# Patient Record
Sex: FEMALE | Race: WHITE | URBAN - METROPOLITAN AREA
[De-identification: names, ages, dates, MRNs, and addresses within clinical notes are randomized per-mention and may not be internally consistent; named-entity substitution may affect disease eponyms.]

---

## 2022-02-21 ENCOUNTER — OFFICE VISIT (OUTPATIENT)
Dept: GERIATRIC MEDICINE | Age: 87
End: 2022-02-21
Payer: MEDICARE

## 2022-02-21 DIAGNOSIS — S09.90XD CLOSED HEAD INJURY, SUBSEQUENT ENCOUNTER: ICD-10-CM

## 2022-02-21 DIAGNOSIS — E03.9 HYPOTHYROIDISM, UNSPECIFIED TYPE: ICD-10-CM

## 2022-02-21 DIAGNOSIS — D64.9 ANEMIA, UNSPECIFIED TYPE: ICD-10-CM

## 2022-02-21 DIAGNOSIS — I10 PRIMARY HYPERTENSION: ICD-10-CM

## 2022-02-21 DIAGNOSIS — F32.A DEPRESSION, UNSPECIFIED DEPRESSION TYPE: ICD-10-CM

## 2022-02-21 DIAGNOSIS — S00.83XD: ICD-10-CM

## 2022-02-21 DIAGNOSIS — E78.5 HYPERLIPIDEMIA, UNSPECIFIED HYPERLIPIDEMIA TYPE: ICD-10-CM

## 2022-02-21 DIAGNOSIS — R55 SYNCOPE AND COLLAPSE: ICD-10-CM
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
## 2022-02-21 DIAGNOSIS — M70.42 PREPATELLAR BURSITIS OF LEFT KNEE: ICD-10-CM

## 2022-02-21 DIAGNOSIS — I48.19 PERSISTENT ATRIAL FIBRILLATION (HCC): ICD-10-CM

## 2022-02-21 PROCEDURE — G8484 FLU IMMUNIZE NO ADMIN: HCPCS | Performed by: NURSE PRACTITIONER

## 2022-02-21 PROCEDURE — 1123F ACP DISCUSS/DSCN MKR DOCD: CPT | Performed by: NURSE PRACTITIONER

## 2022-02-21 PROCEDURE — 99309 SBSQ NF CARE MODERATE MDM 30: CPT | Performed by: NURSE PRACTITIONER

## 2022-02-22 PROBLEM — I10 PRIMARY HYPERTENSION: Status: ACTIVE | Noted: 2022-02-22

## 2022-02-22 PROBLEM — R55 SYNCOPE AND COLLAPSE: Status: ACTIVE | Noted: 2022-02-22

## 2022-02-22 PROBLEM — I48.19 PERSISTENT ATRIAL FIBRILLATION (HCC): Status: ACTIVE | Noted: 2022-02-22

## 2022-02-22 PROBLEM — F32.A DEPRESSION: Status: ACTIVE | Noted: 2022-02-22
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
## 2022-02-22 PROBLEM — S09.90XA CLOSED HEAD INJURY: Status: ACTIVE | Noted: 2022-02-22

## 2022-02-22 PROBLEM — D64.9 ANEMIA: Status: ACTIVE | Noted: 2022-02-22

## 2022-02-22 PROBLEM — E78.5 HYPERLIPIDEMIA: Status: ACTIVE | Noted: 2022-02-22

## 2022-02-22 PROBLEM — M70.42 PREPATELLAR BURSITIS OF LEFT KNEE: Status: ACTIVE | Noted: 2022-02-22

## 2022-02-22 PROBLEM — S00.83XA: Status: ACTIVE | Noted: 2022-02-22

## 2022-02-22 PROBLEM — E03.9 HYPOTHYROIDISM: Status: ACTIVE | Noted: 2022-02-22

## 2022-02-22 LAB
BASOPHILS ABSOLUTE: 0 K/UL (ref 0–0.2)
BASOPHILS RELATIVE PERCENT: 0.5 %
EOSINOPHILS ABSOLUTE: 0.1 K/UL (ref 0–0.7)
EOSINOPHILS RELATIVE PERCENT: 1.8 %
HCT VFR BLD CALC: 28.4 % (ref 37–47)
HEMOGLOBIN: 9.4 G/DL (ref 12–16)
LYMPHOCYTES ABSOLUTE: 0.9 K/UL (ref 1–4.8)
LYMPHOCYTES RELATIVE PERCENT: 16.4 %
MCH RBC QN AUTO: 32 PG (ref 27–31.3)
MCHC RBC AUTO-ENTMCNC: 33.2 % (ref 33–37)
MCV RBC AUTO: 96.6 FL (ref 82–100)
MONOCYTES ABSOLUTE: 0.5 K/UL (ref 0.2–0.8)
MONOCYTES RELATIVE PERCENT: 8.8 %
NEUTROPHILS ABSOLUTE: 4 K/UL (ref 1.4–6.5)
NEUTROPHILS RELATIVE PERCENT: 72.5 %
PDW BLD-RTO: 14.3 % (ref 11.5–14.5)
PLATELET # BLD: 153 K/UL (ref 130–400)
RBC # BLD: 2.94 M/UL (ref 4.2–5.4)
TSH SERPL DL<=0.05 MIU/L-ACNC: 5.75 UIU/ML (ref 0.44–3.86)
WBC # BLD: 5.5 K/UL (ref 4.8–10.8)

## 2022-02-22 RX ORDER — HYDROCODONE BITARTRATE AND ACETAMINOPHEN 5; 325 MG/1; MG/1
0.5 TABLET ORAL EVERY 8 HOURS PRN
Qty: 28 TABLET | Refills: 0 | Status: SHIPPED | OUTPATIENT
Start: 2022-02-22 | End: 2022-03-01

## 2022-02-22 NOTE — PROGRESS NOTES
Organizations: Not on file    Attends Club or Organization Meetings: Not on file    Marital Status: Not on file   Intimate Partner Violence:     Fear of Current or Ex-Partner: Not on file    Emotionally Abused: Not on file    Physically Abused: Not on file    Sexually Abused: Not on file   Housing Stability:     Unable to Pay for Housing in the Last Year: Not on file    Number of [place] in the Last Year: Not on file    Unstable Housing in the Last Year: Not on file     ALLERGIES:  Contrast [iodides], Promethazine, Thiopental, and Trazodone    MEDICATIONS: Acetaminophen 500 mg tab 2 tabs p.o. twice daily, apixaban 5 mg tab twice daily, ASA delayed release 81 mg tab p.o. daily, atorvastatin calcium 40 mg tab p.o. daily, bimatoprost solution 0.01% 1 drop both eyes daily, Dulcolax suppository 10 mg rectal suppository daily as needed escitalopram oxylate tab 20 mg tab 1 tab p.o. daily, ferrous sulfate 325 mg tab p.o. daily, gabapentin 100 mg capsule p.o. daily as needed, gabapentin 100 mg tab p.o. daily, levothyroxine sodium 125 MCG tab p.o. daily, magnesium citrate solution 1.745 g\30 mL give 30 mL p.o. daily as needed metoprolol succinate ER tab give 12.5 mg p.o. daily, milk of magnesia suspension 1200 mg per 15 mL 30 mL p.o. daily as needed nitroglycerin tablet 0.4 mg sublingual tab as needed for chest pain every 3 minutes up to 3 doses senna tab 8.6 mg tab 2 tabs p.o. daily, timolol maleate solution 0.5% instill 1 drop right eye 3 times daily, vitamin D3 2000 units tab p.o. twice daily    REVIEW OF SYSTEMS:  Resident reports headache. Denies dizziness, blurred vision. She denies any fever, chills, sore throat. She denies any rashes, bruises, or open areas. She denies any chest pain, chest discomfort, or heart palpitations. She denies any shortness of breath or cough. She denies any nausea, vomiting, or abdominal pain. She denies any urinary urgency, frequency, or dysuria.   She reports constipation. Denies diarrhea. She states she is eating okay, sleeping okay, and she currently reports pain, generalized, constant, 10 out of 10 on a pain scale. PHYSICAL EXAMINATION:  Most recent vital signs: Blood pressure 110/66, respirations 18, temperature 97.8, heart rate 79, pulse oxing 93% room air     Constitutional:  Resident is a 80 y.o. female frail alert, pleasant, and cooperative with exam.  In no apparent distress. Integument:  Pink, warm, dry. No rashes noted. Extensive bruising in various stages of healing to the patient's left jaw, neck, shoulder, left torso, left leg, and left knee. HEENT:  Normocephalic, atraumatic. External ears appear to be within normal limits. She is hard of hearing. Conjunctivae pink. Sclerae nonicteric. Mucous membranes pink, moist.  No oropharyngeal exudate. Neck:  Supple. No cervical or clavicular lymphadenopathy. No masses noted. She is noted to have asked extensive ecchymosis surrounding her neck various stages of healing worse on the left side. Cardiovascular:  Heart rate regular rate and rhythm. No murmurs, gallops, rubs noted. Respiratory:  Lung sounds Normal.  Respirations nonlabored. The patient is not using accessory muscles with breathing. Currently pulse ox'ing 93% on room air. Abdomen:  Soft, nontender, nondistended, normoactive bowel sounds. No masses noted  Musculoskeletal: No muscle tenderness. No joint tenderness. PV:  Peripheral pulses present. She  does have nonpitting edema to her left lower extremity. Psychological: Mood stable. Affect pleasant. Speech normal rate and tone. ASSESSMENT AND PLAN:      Diagnosis Orders   1. Fall, subsequent encounter     2. Prepatellar bursitis of left knee  HYDROcodone-acetaminophen (NORCO) 5-325 MG per tablet   3. Closed head injury, subsequent encounter  HYDROcodone-acetaminophen (NORCO) 5-325 MG per tablet   4. Syncope and collapse     5. Primary hypertension     6.  Persistent atrial fibrillation ([facility] [address]     7. Contusion of mandibular joint area, subsequent encounter  HYDROcodone-acetaminophen (NORCO) 5-325 MG per tablet   8. Anemia, unspecified type     9. Hyperlipidemia, unspecified hyperlipidemia type     10. Hypothyroidism, unspecified type     11. Depression, unspecified depression type          1. Patient has not had any falls since her admission to this facility. 2. She did reports that she is having pain she reports the Tylenol is not effective in meeting her pain needs and she is requesting something stronger. I will order Norco 5/3/2025 one half tab every 8 hours as needed. She will continue to follow with Ortho as ordered. 3. Patient reports that the pain in her left is improving somewhat. She states that it was much more swollen before. 4. No syncopal episodes since her admission to this facility. 5. Patient's blood pressure has remained stable. We will continue her metoprolol as ordered. 6. Patient currently in a regular rate. She has not had any chest pain chest discomfort or heart palpitations. We will continue her apixaban as ordered. 7. Pain and swelling is slowly improving. She is able to eat her meals without difficulty. She reports that the Tylenol helps somewhat but she would like something stronger for her pain. 8. Hemoglobin 13.7 hematocrit 41.8. She denies any fatigue, chest pain, or shortness of breath. 9. Most recent lipid panel total cholesterol 143, triglycerides 146, HDL 44, LDL 70. Continue atorvastatin. 10. Last TSH on file 3.699 I will order TSH to be done with labs. We will continue her Synthroid as ordered. 11. Patient has not had any depressive episodes we will continue her escitalopram as ordered    I have reviewed this resident's medication and treatment plan as well as most recent lab work. CMP, CBC with diff., TSH and Vitamin D as ordered. I will also order Norco 5/325mg tab-one half tab by mouth every eight hours for pain.   No further changes will be made at this time. Return in about 1 week (around 2/28/2022), or if symptoms worsen or fail to improve. Please note this report is partially produced by using speech recognition hardware. It may contain errors related to the system, including grammar, punctuation and spelling as well as words and phrases that may seem inaccurate. For any questions or concerns feel free to contact me for clarification        Electronically Signed By: Bari Lan. Drea Hubbard CNP on 2/22/22   ______________________________  Bari Lan.  Steph NÚÑEZ CNP

## 2022-02-23 ENCOUNTER — OFFICE VISIT (OUTPATIENT)
Dept: GERIATRIC MEDICINE | Age: 87
End: 2022-02-23
Payer: MEDICARE

## 2022-02-23 DIAGNOSIS — R55 SYNCOPE, UNSPECIFIED SYNCOPE TYPE: Primary | ICD-10-CM

## 2022-02-23 DIAGNOSIS — S09.90XD CLOSED HEAD INJURY, SUBSEQUENT ENCOUNTER: ICD-10-CM

## 2022-02-23 DIAGNOSIS — R53.1 WEAKNESS: ICD-10-CM

## 2022-02-23 DIAGNOSIS — I48.19 PERSISTENT ATRIAL FIBRILLATION (HCC): ICD-10-CM

## 2022-02-23 LAB — VITAMIN D 25-HYDROXY: 60 NG/ML (ref 30–100)

## 2022-02-23 PROCEDURE — 1123F ACP DISCUSS/DSCN MKR DOCD: CPT | Performed by: INTERNAL MEDICINE

## 2022-02-23 PROCEDURE — 99305 1ST NF CARE MODERATE MDM 35: CPT | Performed by: INTERNAL MEDICINE

## 2022-02-23 PROCEDURE — G8484 FLU IMMUNIZE NO ADMIN: HCPCS | Performed by: INTERNAL MEDICINE

## 2022-03-04 ENCOUNTER — OFFICE VISIT (OUTPATIENT)
Dept: GERIATRIC MEDICINE | Age: 87
End: 2022-03-04
Payer: MEDICARE

## 2022-03-04 DIAGNOSIS — I48.19 PERSISTENT ATRIAL FIBRILLATION (HCC): Primary | ICD-10-CM

## 2022-03-04 DIAGNOSIS — R55 SYNCOPE AND COLLAPSE: ICD-10-CM

## 2022-03-04 PROCEDURE — 1123F ACP DISCUSS/DSCN MKR DOCD: CPT | Performed by: NURSE PRACTITIONER

## 2022-03-04 PROCEDURE — 99308 SBSQ NF CARE LOW MDM 20: CPT | Performed by: NURSE PRACTITIONER

## 2022-03-04 PROCEDURE — G8484 FLU IMMUNIZE NO ADMIN: HCPCS | Performed by: NURSE PRACTITIONER

## 2022-03-08 ENCOUNTER — OFFICE VISIT (OUTPATIENT)
Dept: GERIATRIC MEDICINE | Age: 87
End: 2022-03-08
Payer: MEDICARE

## 2022-03-08 DIAGNOSIS — M25.562 ACUTE PAIN OF LEFT KNEE: ICD-10-CM

## 2022-03-08 DIAGNOSIS — R05.9 COUGH: Primary | ICD-10-CM

## 2022-03-08 PROCEDURE — 1123F ACP DISCUSS/DSCN MKR DOCD: CPT | Performed by: NURSE PRACTITIONER

## 2022-03-08 PROCEDURE — G8484 FLU IMMUNIZE NO ADMIN: HCPCS | Performed by: NURSE PRACTITIONER

## 2022-03-08 PROCEDURE — 99309 SBSQ NF CARE MODERATE MDM 30: CPT | Performed by: NURSE PRACTITIONER

## 2022-03-09 LAB
ANION GAP SERPL CALCULATED.3IONS-SCNC: 16 MEQ/L (ref 9–15)
BUN BLDV-MCNC: 21 MG/DL (ref 8–23)
CALCIUM SERPL-MCNC: 9.7 MG/DL (ref 8.5–9.9)
CHLORIDE BLD-SCNC: 100 MEQ/L (ref 95–107)
CO2: 22 MEQ/L (ref 20–31)
CREAT SERPL-MCNC: 0.83 MG/DL (ref 0.5–0.9)
GFR AFRICAN AMERICAN: >60
GFR NON-AFRICAN AMERICAN: >60
GLUCOSE BLD-MCNC: 123 MG/DL (ref 70–99)
HCT VFR BLD CALC: 34.8 % (ref 37–47)
HEMOGLOBIN: 11.4 G/DL (ref 12–16)
MCH RBC QN AUTO: 31.9 PG (ref 27–31.3)
MCHC RBC AUTO-ENTMCNC: 32.7 % (ref 33–37)
MCV RBC AUTO: 97.5 FL (ref 82–100)
PDW BLD-RTO: 16 % (ref 11.5–14.5)
PLATELET # BLD: 286 K/UL (ref 130–400)
POTASSIUM SERPL-SCNC: 4 MEQ/L (ref 3.4–4.9)
RBC # BLD: 3.57 M/UL (ref 4.2–5.4)
SODIUM BLD-SCNC: 138 MEQ/L (ref 135–144)
WBC # BLD: 4.5 K/UL (ref 4.8–10.8)

## 2022-03-10 ENCOUNTER — OFFICE VISIT (OUTPATIENT)
Dept: GERIATRIC MEDICINE | Age: 87
End: 2022-03-10
Payer: MEDICARE

## 2022-03-10 DIAGNOSIS — U07.1 COVID: Primary | ICD-10-CM

## 2022-03-10 LAB
ALBUMIN SERPL-MCNC: 3.9 G/DL (ref 3.5–4.6)
ALP BLD-CCNC: 92 U/L (ref 40–130)
ALT SERPL-CCNC: 9 U/L (ref 0–33)
ANION GAP SERPL CALCULATED.3IONS-SCNC: 12 MEQ/L (ref 9–15)
AST SERPL-CCNC: 21 U/L (ref 0–35)
BASOPHILS ABSOLUTE: 0 K/UL (ref 0–0.2)
BASOPHILS RELATIVE PERCENT: 0.8 %
BILIRUB SERPL-MCNC: 0.6 MG/DL (ref 0.2–0.7)
BILIRUBIN DIRECT: <0.2 MG/DL (ref 0–0.4)
BILIRUBIN, INDIRECT: NORMAL MG/DL (ref 0–0.6)
BUN BLDV-MCNC: 22 MG/DL (ref 8–23)
C-REACTIVE PROTEIN: 27.5 MG/L (ref 0–5)
CALCIUM SERPL-MCNC: 9.7 MG/DL (ref 8.5–9.9)
CHLORIDE BLD-SCNC: 102 MEQ/L (ref 95–107)
CO2: 25 MEQ/L (ref 20–31)
CREAT SERPL-MCNC: 0.84 MG/DL (ref 0.5–0.9)
D DIMER: 2.89 MG/L FEU (ref 0–0.5)
EOSINOPHILS ABSOLUTE: 0.2 K/UL (ref 0–0.7)
EOSINOPHILS RELATIVE PERCENT: 3.3 %
GFR AFRICAN AMERICAN: >60
GFR NON-AFRICAN AMERICAN: >60
GLUCOSE BLD-MCNC: 90 MG/DL (ref 70–99)
HCT VFR BLD CALC: 32.5 % (ref 37–47)
HEMOGLOBIN: 10.8 G/DL (ref 12–16)
INR BLD: 1.4
LACTATE DEHYDROGENASE: 384 U/L (ref 135–214)
LYMPHOCYTES ABSOLUTE: 1.2 K/UL (ref 1–4.8)
LYMPHOCYTES RELATIVE PERCENT: 24.6 %
MCH RBC QN AUTO: 31.9 PG (ref 27–31.3)
MCHC RBC AUTO-ENTMCNC: 33.3 % (ref 33–37)
MCV RBC AUTO: 95.8 FL (ref 82–100)
MONOCYTES ABSOLUTE: 0.5 K/UL (ref 0.2–0.8)
MONOCYTES RELATIVE PERCENT: 10.1 %
NEUTROPHILS ABSOLUTE: 2.9 K/UL (ref 1.4–6.5)
NEUTROPHILS RELATIVE PERCENT: 61.2 %
PDW BLD-RTO: 15.9 % (ref 11.5–14.5)
PLATELET # BLD: 280 K/UL (ref 130–400)
POTASSIUM SERPL-SCNC: 3.9 MEQ/L (ref 3.4–4.9)
PROCALCITONIN: 0.11 NG/ML (ref 0–0.15)
PROTHROMBIN TIME: 17 SEC (ref 12.3–14.9)
RBC # BLD: 3.39 M/UL (ref 4.2–5.4)
SEDIMENTATION RATE, ERYTHROCYTE: 53 MM (ref 0–30)
SODIUM BLD-SCNC: 139 MEQ/L (ref 135–144)
TOTAL CK: 36 U/L (ref 0–170)
TOTAL PROTEIN: 7.1 G/DL (ref 6.3–8)
TROPONIN: <0.01 NG/ML (ref 0–0.01)
WBC # BLD: 4.7 K/UL (ref 4.8–10.8)

## 2022-03-10 PROCEDURE — 1123F ACP DISCUSS/DSCN MKR DOCD: CPT | Performed by: NURSE PRACTITIONER

## 2022-03-10 PROCEDURE — 99309 SBSQ NF CARE MODERATE MDM 30: CPT | Performed by: NURSE PRACTITIONER

## 2022-03-10 PROCEDURE — G8484 FLU IMMUNIZE NO ADMIN: HCPCS | Performed by: NURSE PRACTITIONER

## 2022-03-11 ENCOUNTER — OFFICE VISIT (OUTPATIENT)
Dept: GERIATRIC MEDICINE | Age: 87
End: 2022-03-11
Payer: MEDICARE

## 2022-03-11 DIAGNOSIS — U07.1 COVID: Primary | ICD-10-CM

## 2022-03-11 LAB — FERRITIN: 396 UG/L (ref 13–150)

## 2022-03-11 PROCEDURE — G8484 FLU IMMUNIZE NO ADMIN: HCPCS | Performed by: NURSE PRACTITIONER

## 2022-03-11 PROCEDURE — 1123F ACP DISCUSS/DSCN MKR DOCD: CPT | Performed by: NURSE PRACTITIONER

## 2022-03-11 PROCEDURE — 99309 SBSQ NF CARE MODERATE MDM 30: CPT | Performed by: NURSE PRACTITIONER

## 2022-03-15 LAB — TSH SERPL DL<=0.05 MIU/L-ACNC: 21.15 UIU/ML (ref 0.44–3.86)

## 2022-03-16 LAB
T3 TOTAL: 75 NG/DL (ref 60–181)
T4 TOTAL: 7 UG/DL (ref 4.5–10.9)

## 2022-03-18 ASSESSMENT — ENCOUNTER SYMPTOMS
COUGH: 1
BACK PAIN: 1
SHORTNESS OF BREATH: 0
CONSTIPATION: 1

## 2022-03-18 NOTE — PROGRESS NOTES
Patient Name: Oral Irving    YOB: 1927  Medical Record Number: [id number]        History of Present Illness: This 51-year-old woman was admitted here after recent hospitalization. Patient had a syncopal episode from a seated position. Patient did undergo evaluation by cardiology patient was recommended by neurology. Patient has been stabilized CT of the head was unremarkable acute intracranial bleed. Patient was tested for COVID-19 patient renal function stable include IV fluid rehydration patient had was stabilized had undergone course of physical therapy grade 3 transferred here for ongoing course of care. Patient is at her baseline pleasant but confused globally weak at her baseline. Review of Systems   Constitutional: Positive for fatigue. HENT: Negative for congestion. Respiratory: Positive for cough. Negative for shortness of breath. Cardiovascular: Positive for leg swelling. Negative for chest pain. Gastrointestinal: Positive for constipation. Musculoskeletal: Positive for back pain. Skin: Negative for rash. Neurological: Positive for weakness. All other systems reviewed and are negative. Review of Systems: All 14 review of systems negative other than as stated above    Social History     Tobacco Use    Smoking status: Not on file    Smokeless tobacco: Not on file   Substance Use Topics    Alcohol use: Not on file    Drug use: Not on file         No past medical history on file. No past surgical history on file. No current outpatient medications on file prior to visit. No current facility-administered medications on file prior to visit. Allergies   Allergen Reactions    Contrast [Iodides]     Promethazine     Thiopental     Trazodone          No family history on file. Physical Exam:      Physical Exam  Vitals and nursing note reviewed. Constitutional:       Appearance: Normal appearance. She is normal weight.    HENT:      Head: Normocephalic and atraumatic. Nose: Nose normal.      Mouth/Throat:      Mouth: Mucous membranes are moist.   Eyes:      Extraocular Movements: Extraocular movements intact. Cardiovascular:      Rate and Rhythm: Normal rate and regular rhythm. Pulmonary:      Effort: Pulmonary effort is normal.      Breath sounds: No rhonchi. Abdominal:      General: Bowel sounds are normal.      Palpations: Abdomen is soft. Musculoskeletal:         General: Swelling present. Normal range of motion. Cervical back: Neck supple. No tenderness. Skin:     General: Skin is warm. Findings: Bruising present. Neurological:      Motor: Weakness present. Psychiatric:         Mood and Affect: Mood normal.         There were no vitals taken for this visit. .   Lab Results   Component Value Date    WBC 4.7 (L) 03/10/2022    HGB 10.8 (L) 03/10/2022    HCT 32.5 (L) 03/10/2022    MCV 95.8 03/10/2022     03/10/2022     Lab Results   Component Value Date     03/10/2022    K 3.9 03/10/2022     03/10/2022    CO2 25 03/10/2022    BUN 22 03/10/2022    CREATININE 0.84 03/10/2022    GLUCOSE 90 03/10/2022    CALCIUM 9.7 03/10/2022          ASSESSMENT:  Patient Active Problem List   Diagnosis    Fall    Prepatellar bursitis of left knee    Closed head injury    Syncope and collapse    Primary hypertension    Persistent atrial fibrillation (HCC)    Contusion of mandibular joint area    Anemia    Hyperlipidemia    Hypothyroidism    Depression         PLAN:   Diagnosis Orders   1. Syncope, unspecified syncope type     2. Persistent atrial fibrillation (HCC)     3. Closed head injury, subsequent encounter     4. Weakness       The patient is clinically stable course of physical therapy outpatient therapy balance training goal maximize functional status. Notes new RVR continue with continue with nutritional support and skin surveillance.

## 2022-03-21 NOTE — PROGRESS NOTES
[facility]  3/4/2022    Magdy Patiño  is a 80 y.o. in the  being seen for a f/u of   Chief Complaint   Patient presents with    Atrial Fibrillation    Loss of Consciousness       HPI patient being seen today for acute visit for A. fib and syncope with collapse. Nursing staff report resident has remained at her baseline and has not had any syncopal events since her admission to this facility. No past medical history on file. No past surgical history on file. No family history on file. Social History     Socioeconomic History    Marital status:      Spouse name: Not on file    Number of children: Not on file    Years of education: Not on file    Highest education level: Not on file   Occupational History    Not on file   Tobacco Use    Smoking status: Not on file    Smokeless tobacco: Not on file   Substance and Sexual Activity    Alcohol use: Not on file    Drug use: Not on file    Sexual activity: Not on file   Other Topics Concern    Not on file   Social History Narrative    Not on file     Social Determinants of Health     Financial Resource Strain:     Difficulty of Paying Living Expenses: Not on file   Food Insecurity:     Worried About Running Out of Food in the Last Year: Not on file    Eric of Food in the Last Year: Not on file   Transportation Needs:     Lack of Transportation (Medical): Not on file    Lack of Transportation (Non-Medical):  Not on file   Physical Activity:     Days of Exercise per Week: Not on file    Minutes of Exercise per Session: Not on file   Stress:     Feeling of Stress : Not on file   Social Connections:     Frequency of Communication with Friends and Family: Not on file    Frequency of Social Gatherings with Friends and Family: Not on file    Attends Religion Services: Not on file    Active Member of Clubs or Organizations: Not on file    Attends Club or Organization Meetings: Not on file    Marital Status: Not on file Intimate Partner Violence:     Fear of Current or Ex-Partner: Not on file    Emotionally Abused: Not on file    Physically Abused: Not on file    Sexually Abused: Not on file   Housing Stability:     Unable to Pay for Housing in the Last Year: Not on file    Number of [place] in the Last Year: Not on file    Unstable Housing in the Last Year: Not on file       Allergies: Contrast [iodides], Promethazine, Thiopental, and Trazodone  NF MEDICATIONS REVIEWED    ROS:  See HPI  Constitutional: There are no reports of behavioral issues, change in appetite, fever, or weakness. No gross bleeding issues. Respiratory: denies SOB, dyspnea, dyspnea on exertion,   Cardiovascular: denies CP, lightheadedness,   GI: No reports of change of bowel habits, no N/V/D/C. : no reports of change in bladder habits  Extremities: No reports of pain issues, no edema    Physical exam:  Blood pressure 115/77, temperature 97.8, heart rate 87, respirations 18, pulse ox 96% room air. Constitutional: Alert elderly female in no apparent distress. Pleasant cooperative with exam.  HEENT: normocephalic, slightly hard of hearing, conjunctiva pink, sclera nonicteric, MMM, no cyanosis. NO neck mass visualized   Cardiovascular: Regular rate  Respiratory: unlabored respirations, no accessory muscle use noted  GI: abdomen ND  : no bladder tenderness  Extremities: Left lower extremity with bruises in various stages of healing. Edema improving to left patella. Psych: pleasant, normal thought content, speech clear    ASSESSMENT:     Diagnosis Orders   1. Persistent atrial fibrillation ([address]     2. Syncope and collapse         PLAN:   Agrees with POC     Return in about 1 week (around 3/11/2022), or if symptoms worsen or fail to improve. Pertinent POC, labs, have been reviewed, continue same. Encourage fluids and good nutrition. Stress fall prevention strategies.   Nursing to notify Pt/POA for agreement to POC ________________________    Lynda Berenice Citlallikoby NÚÑEZ, [address], [address] [place] [address]  Office [phone]  Fax [phone]      Please note this report is partially produced by using speech recognition hardware. It may contain errors related to the system, including grammar, punctuation and spelling as well as words and phrases that may seem inaccurate.   For any questions or concerns feel free to contact me for clarification

## 2022-03-23 PROBLEM — R05.9 COUGH: Status: ACTIVE | Noted: 2022-03-23

## 2022-03-23 PROBLEM — M25.562 ACUTE PAIN OF LEFT KNEE: Status: ACTIVE | Noted: 2022-03-23
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
## 2022-03-28 PROBLEM — U07.1 COVID: Status: ACTIVE | Noted: 2022-03-28

## 2022-03-28 NOTE — PROGRESS NOTES
Subjective:     CC: COVID-19    HPI:  Tien Blackmon is a 80 y.o. female was seen today for COVID 19 evaluation. Patient has been COVID 19 positive since 3/10/2022. They were seen with full PPE and observing continued droplet precautions. Condition discussed with nursing staff and treatment reviewed. Recent bloodwork, chest x-ray and vital signs reviewed. Fever curve and oxygen demands reviewed. Nutritional status and intake reviewed. Patient is demonstrating increased respiratory complaints at this time. Patient has not been febrile in the last 24 hours. Patient is able to feed themselves. Patient is not demonstrating increased oxygen demand. No past medical history on file. No past surgical history on file. No family history on file. Social History     Socioeconomic History    Marital status:      Spouse name: Not on file    Number of children: Not on file    Years of education: Not on file    Highest education level: Not on file   Occupational History    Not on file   Tobacco Use    Smoking status: Not on file    Smokeless tobacco: Not on file   Substance and Sexual Activity    Alcohol use: Not on file    Drug use: Not on file    Sexual activity: Not on file   Other Topics Concern    Not on file   Social History Narrative    Not on file     Social Determinants of Health     Financial Resource Strain:     Difficulty of Paying Living Expenses: Not on file   Food Insecurity:     Worried About Running Out of Food in the Last Year: Not on file    Eric of Food in the Last Year: Not on file   Transportation Needs:     Lack of Transportation (Medical): Not on file    Lack of Transportation (Non-Medical):  Not on file   Physical Activity:     Days of Exercise per Week: Not on file    Minutes of Exercise per Session: Not on file   Stress:     Feeling of Stress : Not on file   Social Connections:     Frequency of Communication with Friends and Family: Not on file    Frequency of Social Gatherings with Friends and Family: Not on file    Attends Hoahaoism Services: Not on file    Active Member of Clubs or Organizations: Not on file    Attends Club or Organization Meetings: Not on file    Marital Status: Not on file   Intimate Partner Violence:     Fear of Current or Ex-Partner: Not on file    Emotionally Abused: Not on file    Physically Abused: Not on file    Sexually Abused: Not on file   Housing Stability:     Unable to Pay for Housing in the Last Year: Not on file    Number of [place] in the Last Year: Not on file    Unstable Housing in the Last Year: Not on file     No current outpatient medications on file prior to visit. No current facility-administered medications on file prior to visit. Review of Systems    Objective:     Physical Exam     .[facility]  Lab Results   Component Value Date    FERRITIN 396 (H) 03/10/2022     Lab Results   Component Value Date    WBC 4.7 (L) 03/10/2022    HGB 10.8 (L) 03/10/2022    HCT 32.5 (L) 03/10/2022    MCV 95.8 03/10/2022     03/10/2022     Lab Results   Component Value Date     03/10/2022    K 3.9 03/10/2022     03/10/2022    CO2 25 03/10/2022    BUN 22 03/10/2022    CREATININE 0.84 03/10/2022    GLUCOSE 90 03/10/2022    CALCIUM 9.7 03/10/2022      Lab Results   Component Value Date    CKTOTAL 36 03/10/2022    TROPONINI <0.010 03/10/2022     Lab Results   Component Value Date    CRP 27.5 (H) 03/10/2022      Lab Results   Component Value Date    DDIMER 2.89 ([facility]) 03/10/2022      Lab Results   Component Value Date    CKTOTAL 36 03/10/2022       Please refer to on site chart for most recent chest Xray result, reviewed at this time.       Assessment & Plan:     Continue with current regimen of  Vitamin C 500mg PO BID  Vitamin D 1000 IU PO QD  Zinc 50 mg PO QD  Lovenox 30mg SQ BID    Patient does not require Dexamethasone 6mg PO QD  Patient does not  require antibiotics for concomitant bacterial pneumonia. Patient does not qualify for monoclonal antibodies. Continue to monitor blood work:  Weekly D-Dimer, LDH, CRP, CPK, Procalcitonin, Ferritin, Troponin, CBC, BMP. Patient's case discussed with nursing staff and Code Status reviewed. Please note orders entered on site at facility after discussion with appropriate facility nursing/therapy/ / nutritional staff. Current longstanding medical problems and acute medical issues addressed with staff. Available data and data elements in on site paper chart reviewed and analyzed. Current external consultant notes reviewed in on site chart. Ordered laboratory testing and imaging will be reviewed when available.         ________________________    Walker Herrmann. [facility], [address]  Office [phone]  Fax [phone]    Please note this report is partially produced by using speech recognition hardware. It may contain errors related to the system, including grammar, punctuation and spelling as well as words and phrases that may seem inaccurate.   For any questions or concerns feel free to contact me for clarification

## 2022-03-29 NOTE — PROGRESS NOTES
2/7/22: /P SX BY DR TONEY ON 1/4/22 WITH AKREOS AND GORETEX SUTURES. WELL POSITIONED. CENTERED. CONT TO MONITOR. WILL SEND FOR A NEW RX AND ASSESS ASTIGMATISM. DEPENDING ON IMPROVEMENT, CONSIDER REMOVAL OF NYLON SUTURES. Subjective:     CC: COVID-19    HPI:  Umang Mendez is a 80 y.o. female was seen today for COVID 19 evaluation. Patient has been COVID 19 positive since 3/10/22. They were seen with full PPE and observing continued droplet precautions. Condition discussed with nursing staff and treatment reviewed. Recent bloodwork, chest x-ray and vital signs reviewed. Fever curve and oxygen demands reviewed. Nutritional status and intake reviewed. Patient is not demonstrating increased respiratory complaints at this time. Patient has not been febrile in the last 24 hours. Patient is able to feed themselves. Patient is not demonstrating increased oxygen demand. No past medical history on file. No past surgical history on file. No family history on file. Social History     Socioeconomic History    Marital status:      Spouse name: Not on file    Number of children: Not on file    Years of education: Not on file    Highest education level: Not on file   Occupational History    Not on file   Tobacco Use    Smoking status: Not on file    Smokeless tobacco: Not on file   Substance and Sexual Activity    Alcohol use: Not on file    Drug use: Not on file    Sexual activity: Not on file   Other Topics Concern    Not on file   Social History Narrative    Not on file     Social Determinants of Health     Financial Resource Strain:     Difficulty of Paying Living Expenses: Not on file   Food Insecurity:     Worried About Running Out of Food in the Last Year: Not on file    Eric of Food in the Last Year: Not on file   Transportation Needs:     Lack of Transportation (Medical): Not on file    Lack of Transportation (Non-Medical):  Not on file   Physical Activity:     Days of Exercise per Week: Not on file    Minutes of Exercise per Session: Not on file   Stress:     Feeling of Stress : Not on file   Social Connections:     Frequency of Communication with Friends and Family: Not on file    Frequency of Social Gatherings with Friends and Family: Not on file    Attends [employer] Services: Not on file    Active Member of Clubs or Organizations: Not on file    Attends Club or Organization Meetings: Not on file    Marital Status: Not on file   Intimate Partner Violence:     Fear of Current or Ex-Partner: Not on file    Emotionally Abused: Not on file    Physically Abused: Not on file    Sexually Abused: Not on file   Housing Stability:     Unable to Pay for Housing in the Last Year: Not on file    Number of [place] in the Last Year: Not on file    Unstable Housing in the Last Year: Not on file     No current outpatient medications on file prior to visit. No current facility-administered medications on file prior to visit. Review of Systems  Denies headache dizziness blurred vision. Denies fever, chills, cough, or sore throat. Denies chest pain chest discomfort or heart palpitations denies shortness of breath. Denies nausea, vomiting, or abdominal pain. Denies constipation or diarrhea. Denies muscle aches or joint discomfort. Reports appetite is stable. Objective:     Physical Exam   Blood pressure 118/66, temperature 96.8, heart rate 80, respirations 18, pulse ox 96% room air. Constitutional: Alert, elderly, frail, female. In no apparent distress. HEENT: Normocephalic, healing bruise to left side of face and neck various stages of healing, conjunctiva pink, sclera nonicteric, MMM, no oropharyngeal exudate. Neck: Supple, no cervical or clavicular lymphadenopathy. No masses noted. Cardiovascular: Regular rate and rhythm. No murmurs gallops or rubs noted. Respiratory: LS CTA, respirations even and unlabored, no use of accessory muscles with breathing, currently pulse oxing 96% on room air. Abdomen: Soft, NT, ND, normoactive bowel sounds. No masses noted. Peripheral vascular: Peripheral pulses present. Edema to left knee moderate but improving.   Multiple areas of bruising to left lower extremity in various stages of healing, present since admission. Skin: Pink warm, dry. Multiple areas of ecchymosis on the residents left side of her body in various stages of healing from fall prior to admission, otherwise no rashes, bruises. DSD to left knee. Psych: Mood stable. Affect pleasant. Lab Results   Component Value Date    FERRITIN 396 (H) 03/10/2022     Lab Results   Component Value Date    WBC 4.7 (L) 03/10/2022    HGB 10.8 (L) 03/10/2022    HCT 32.5 (L) 03/10/2022    MCV 95.8 03/10/2022     03/10/2022     Lab Results   Component Value Date     03/10/2022    K 3.9 03/10/2022     03/10/2022    CO2 25 03/10/2022    BUN 22 03/10/2022    CREATININE 0.84 03/10/2022    GLUCOSE 90 03/10/2022    CALCIUM 9.7 03/10/2022      Lab Results   Component Value Date    CKTOTAL 36 03/10/2022    TROPONINI <0.010 03/10/2022     Lab Results   Component Value Date    CRP 27.5 (H) 03/10/2022      Lab Results   Component Value Date    DDIMER 2.89 ([facility]) 03/10/2022      Lab Results   Component Value Date    CKTOTAL 36 03/10/2022       Please refer to on site chart for most recent chest Xray result, reviewed at this time. Assessment & Plan:     Continue with current regimen of  Vitamin C 500mg PO BID  Vitamin D 1000 IU PO QD  Zinc 50 mg PO QD  Lovenox 30mg SQ BID    Patient does not require Dexamethasone 6mg PO QD  Patient does not  require antibiotics for concomitant bacterial pneumonia. Patient does not qualify for monoclonal antibodies. Continue to monitor blood work:  Weekly D-Dimer, LDH, CRP, CPK, Procalcitonin, Ferritin, Troponin, CBC, BMP. Patient's case discussed with nursing staff and Code Status reviewed. Please note orders entered on site at facility after discussion with appropriate facility nursing/therapy/ / nutritional staff. Current longstanding medical problems and acute medical issues addressed with staff.  Available data and data elements in on site paper chart reviewed and analyzed. Current external consultant notes reviewed in on site chart. Ordered laboratory testing and imaging will be reviewed when available. Please note this report is partially produced by using speech recognition hardware. It may contain errors related to the system, including grammar, punctuation and spelling as well as words and phrases that may seem inaccurate. For any questions or concerns feel free to contact me for clarification     ________________________    Jeovanny Rodriguez.  Sherri NÚÑEZ, [address]  Office [phone]  Fax [phone]

## 2022-04-22 PROBLEM — R05.9 COUGH: Status: RESOLVED | Noted: 2022-03-23 | Resolved: 2022-04-22

## 2023-01-01 ENCOUNTER — OFFICE VISIT (OUTPATIENT)
Dept: GERIATRIC MEDICINE | Age: 88
End: 2023-01-01

## 2023-01-01 DIAGNOSIS — M48.00 SPINAL STENOSIS, UNSPECIFIED SPINAL REGION: Primary | ICD-10-CM

## 2023-01-01 DIAGNOSIS — M48.00 SPINAL STENOSIS, UNSPECIFIED SPINAL REGION: ICD-10-CM

## 2023-01-01 DIAGNOSIS — I48.19 PERSISTENT ATRIAL FIBRILLATION (HCC): Primary | ICD-10-CM

## 2023-01-01 DIAGNOSIS — I10 PRIMARY HYPERTENSION: ICD-10-CM

## 2023-01-01 DIAGNOSIS — E03.9 HYPOTHYROIDISM, UNSPECIFIED TYPE: ICD-10-CM

## 2023-06-07 RX ORDER — HYOSCYAMINE SULFATE 0.125 MG
125 TABLET ORAL EVERY 4 HOURS PRN
COMMUNITY

## 2023-06-07 RX ORDER — PROMETHAZINE HYDROCHLORIDE 25 MG/1
25 TABLET ORAL EVERY 4 HOURS PRN
COMMUNITY

## 2023-06-07 RX ORDER — ATORVASTATIN CALCIUM 40 MG/1
40 TABLET, FILM COATED ORAL DAILY
COMMUNITY

## 2023-06-07 RX ORDER — ONDANSETRON 4 MG/1
4 TABLET, FILM COATED ORAL EVERY 6 HOURS PRN
COMMUNITY

## 2023-06-07 RX ORDER — ESCITALOPRAM OXALATE 20 MG/1
20 TABLET ORAL DAILY
COMMUNITY

## 2023-06-07 RX ORDER — ASPIRIN 81 MG/1
81 TABLET ORAL DAILY
COMMUNITY

## 2023-06-07 RX ORDER — GABAPENTIN 100 MG/1
100 CAPSULE ORAL 2 TIMES DAILY
COMMUNITY

## 2023-06-07 RX ORDER — LANOLIN ALCOHOL/MO/W.PET/CERES
1000 CREAM (GRAM) TOPICAL DAILY
COMMUNITY

## 2023-06-07 RX ORDER — DIAPER,BRIEF,INFANT-TODD,DISP
EACH MISCELLANEOUS EVERY 4 HOURS PRN
COMMUNITY

## 2023-06-07 RX ORDER — POLYETHYLENE GLYCOL 3350 17 G/17G
17 POWDER, FOR SOLUTION ORAL DAILY PRN
COMMUNITY

## 2023-06-07 RX ORDER — FERROUS SULFATE 325(65) MG
325 TABLET ORAL
COMMUNITY

## 2023-06-07 RX ORDER — SENNA PLUS 8.6 MG/1
2 TABLET ORAL DAILY
COMMUNITY

## 2023-06-07 RX ORDER — TIMOLOL MALEATE 5 MG/ML
1 SOLUTION/ DROPS OPHTHALMIC 2 TIMES DAILY
COMMUNITY

## 2023-06-07 RX ORDER — LANOLIN ALCOHOL/MO/W.PET/CERES
400 CREAM (GRAM) TOPICAL DAILY
COMMUNITY

## 2023-06-07 RX ORDER — CHOLECALCIFEROL (VITAMIN D3) 50 MCG
4000 TABLET ORAL DAILY
COMMUNITY

## 2023-06-08 ENCOUNTER — OFFICE VISIT (OUTPATIENT)
Dept: GERIATRIC MEDICINE | Age: 88
End: 2023-06-08
Payer: MEDICARE

## 2023-06-08 DIAGNOSIS — F32.9 MAJOR DEPRESSIVE DISORDER, REMISSION STATUS UNSPECIFIED, UNSPECIFIED WHETHER RECURRENT: ICD-10-CM

## 2023-06-08 DIAGNOSIS — I25.118 CORONARY ARTERY DISEASE OF NATIVE HEART WITH STABLE ANGINA PECTORIS, UNSPECIFIED VESSEL OR LESION TYPE (HCC): ICD-10-CM

## 2023-06-08 DIAGNOSIS — E03.9 HYPOTHYROIDISM, UNSPECIFIED TYPE: ICD-10-CM

## 2023-06-08 DIAGNOSIS — N18.31 STAGE 3A CHRONIC KIDNEY DISEASE (HCC): ICD-10-CM

## 2023-06-08 DIAGNOSIS — M48.00 SPINAL STENOSIS, UNSPECIFIED SPINAL REGION: ICD-10-CM

## 2023-06-08 DIAGNOSIS — I10 PRIMARY HYPERTENSION: ICD-10-CM

## 2023-06-08 DIAGNOSIS — I48.19 PERSISTENT ATRIAL FIBRILLATION (HCC): Primary | ICD-10-CM

## 2023-06-08 PROCEDURE — 1123F ACP DISCUSS/DSCN MKR DOCD: CPT | Performed by: NURSE PRACTITIONER

## 2023-06-08 PROCEDURE — 99310 SBSQ NF CARE HIGH MDM 45: CPT | Performed by: NURSE PRACTITIONER

## 2023-06-15 ENCOUNTER — OFFICE VISIT (OUTPATIENT)
Dept: GERIATRIC MEDICINE | Age: 88
End: 2023-06-15

## 2023-06-15 DIAGNOSIS — I48.19 PERSISTENT ATRIAL FIBRILLATION (HCC): Primary | ICD-10-CM

## 2023-06-15 DIAGNOSIS — I10 PRIMARY HYPERTENSION: ICD-10-CM

## 2023-06-15 DIAGNOSIS — E03.9 HYPOTHYROIDISM, UNSPECIFIED TYPE: ICD-10-CM

## 2023-06-26 PROBLEM — M48.00 SPINAL STENOSIS: Status: ACTIVE | Noted: 2023-06-26

## 2023-06-26 PROBLEM — I25.118 CORONARY ARTERY DISEASE OF NATIVE HEART WITH STABLE ANGINA PECTORIS (HCC): Status: ACTIVE | Noted: 2023-06-26

## 2023-06-26 PROBLEM — N18.31 STAGE 3A CHRONIC KIDNEY DISEASE (HCC): Status: ACTIVE | Noted: 2023-06-26

## 2023-07-05 ENCOUNTER — OFFICE VISIT (OUTPATIENT)
Dept: GERIATRIC MEDICINE | Age: 88
End: 2023-07-05

## 2023-07-05 DIAGNOSIS — M48.00 SPINAL STENOSIS, UNSPECIFIED SPINAL REGION: ICD-10-CM

## 2023-07-05 DIAGNOSIS — I10 PRIMARY HYPERTENSION: Primary | ICD-10-CM

## 2023-07-05 DIAGNOSIS — I48.19 PERSISTENT ATRIAL FIBRILLATION (HCC): ICD-10-CM

## 2023-07-05 ASSESSMENT — ENCOUNTER SYMPTOMS
SHORTNESS OF BREATH: 1
BACK PAIN: 1
CONSTIPATION: 1

## 2023-07-13 ENCOUNTER — OFFICE VISIT (OUTPATIENT)
Dept: GERIATRIC MEDICINE | Age: 88
End: 2023-07-13

## 2023-07-13 DIAGNOSIS — I10 PRIMARY HYPERTENSION: ICD-10-CM

## 2023-07-13 DIAGNOSIS — I48.19 PERSISTENT ATRIAL FIBRILLATION (HCC): Primary | ICD-10-CM

## 2023-07-13 DIAGNOSIS — M48.00 SPINAL STENOSIS, UNSPECIFIED SPINAL REGION: ICD-10-CM

## 2023-07-17 ENCOUNTER — OFFICE VISIT (OUTPATIENT)
Dept: GERIATRIC MEDICINE | Age: 88
End: 2023-07-17

## 2023-07-17 DIAGNOSIS — I10 PRIMARY HYPERTENSION: ICD-10-CM

## 2023-07-17 DIAGNOSIS — I25.118 CORONARY ARTERY DISEASE OF NATIVE HEART WITH STABLE ANGINA PECTORIS, UNSPECIFIED VESSEL OR LESION TYPE (HCC): Primary | ICD-10-CM

## 2023-07-17 DIAGNOSIS — N18.31 STAGE 3A CHRONIC KIDNEY DISEASE (HCC): ICD-10-CM

## 2023-07-20 NOTE — PROGRESS NOTES
[facility]  7/17/2023    Bear Bennett  is a 80 y.o. in the [place] being seen for a f/u of   Chief Complaint   Patient presents with    1 Month Follow-Up       HPI lives in [address] [place] [address]  Being seen monthly for chronic illnesses. No past medical history on file. No past surgical history on file. No family history on file. Social History     Socioeconomic History    Marital status:      Spouse name: Not on file    Number of children: Not on file    Years of education: Not on file    Highest education level: Not on file   Occupational History    Not on file   Tobacco Use    Smoking status: Not on file    Smokeless tobacco: Not on file   Substance and Sexual Activity    Alcohol use: Not on file    Drug use: Not on file    Sexual activity: Not on file   Other Topics Concern    Not on file   Social History Narrative    Not on file     Social Determinants of Health     Financial Resource Strain: Not on file   Food Insecurity: Not on file   Transportation Needs: Not on file   Physical Activity: Not on file   Stress: Not on file   Social Connections: Not on file   Intimate Partner Violence: Not on file   Housing Stability: Not on file       Allergies: Contrast [iodides], Promethazine, Thiopental, and Trazodone  NF MEDICATIONS REVIEWED    ROS:  See HPI  Constitutional: There are no reports of behavioral issues, change in appetite, fever, or weakness. No gross bleeding issues. Respiratory: denies SOB, dyspnea, dyspnea on exertion,   Cardiovascular: denies CP, lightheadedness,   GI: No reports of change of bowel habits, no N/V/D/C. : no reports of change in bladder habits  Extremities: No reports of pain issues, no edema    Physical exam:  Constitutional: Alert, elderly, frail female, sitting in wheelchair, appears stated age, in no apparent distress  HEENT: normocephalic, [place], MMM, no cyanosis.  NO neck mass visualized   Cardiovascular: Regular rate  Respiratory: unlabored respirations, no accessory muscle

## 2023-07-26 ENCOUNTER — OFFICE VISIT (OUTPATIENT)
Dept: GERIATRIC MEDICINE | Age: 88
End: 2023-07-26

## 2023-07-26 DIAGNOSIS — M48.00 SPINAL STENOSIS, UNSPECIFIED SPINAL REGION: ICD-10-CM

## 2023-07-26 DIAGNOSIS — I48.19 PERSISTENT ATRIAL FIBRILLATION (HCC): Primary | ICD-10-CM

## 2023-07-26 DIAGNOSIS — I10 PRIMARY HYPERTENSION: ICD-10-CM

## 2023-07-31 NOTE — PROGRESS NOTES
SUBJECTIVE:  This 43-year-old woman seen follow-up visit for low back pain weakness afibrillation 0 joint disease. Patient has history of hypertension function stable at this time globally weak. Without nausea vomiting. ROS: Limited by cognition  The rest of the 14 point ROS negative    PHYSICAL EXAM: VSS per facility record  Frail appearance pupils are small with are reactive oral mucosa moist chest with no crackles or wheezing cardiovascular showed a regular rate abdomen soft nontender extremity trace edema    ASSESSMENT & PLAN:   Diagnosis Orders   1. Primary hypertension        2. Persistent atrial fibrillation (HCC)        3. Spinal stenosis, unspecified spinal region          Blood pressure stable this time no orthostasis. Continue with beta-blocker function stable notes no RVR  Pressure monitor + orthostasis. No new pain crisis. No past medical history on file. No past surgical history on file. Current Outpatient Medications on File Prior to Visit   Medication Sig Dispense Refill    aspirin 81 MG EC tablet Take 1 tablet by mouth daily Indications: Temporary Stroke      atorvastatin (LIPITOR) 40 MG tablet Take 1 tablet by mouth daily Indications: High Amount of Fats in the Blood      vitamin B-12 (CYANOCOBALAMIN) 1000 MCG tablet Take 1 tablet by mouth daily Indications: Nutritional Support      ferrous sulfate (IRON 325) 325 (65 Fe) MG tablet Take 1 tablet by mouth daily (with breakfast) Indications: Nutritional Support      gabapentin (NEURONTIN) 100 MG capsule Take 1 capsule by mouth 2 times daily.  Indications: Pain      hydrocortisone 1 % cream Apply topically every 4 hours as needed      hyoscyamine (ANASPAZ;LEVSIN) 125 MCG tablet Take 1 tablet by mouth every 4 hours as needed for Cramping Indications: Excessive Secretions in the Upper Airway      escitalopram (LEXAPRO) 20 MG tablet Take 1 tablet by mouth daily Indications: Depression      bimatoprost (LUMIGAN) 0.01 % SOLN

## 2023-08-12 NOTE — PROGRESS NOTES
SUBJECTIVE:        ROS:  The rest of the 14 point ROS negative    PHYSICAL EXAM: VSS per facility record      ASSESSMENT & PLAN:   Diagnosis Orders   1. Persistent atrial fibrillation ([address])        2. Primary hypertension        3. Spinal stenosis, unspecified spinal region                      No past medical history on file. No past surgical history on file. Current Outpatient Medications on File Prior to Visit   Medication Sig Dispense Refill    aspirin 81 MG EC tablet Take 1 tablet by mouth daily Indications: Temporary Stroke      atorvastatin (LIPITOR) 40 MG tablet Take 1 tablet by mouth daily Indications: High Amount of Fats in the Blood      vitamin B-12 (CYANOCOBALAMIN) 1000 MCG tablet Take 1 tablet by mouth daily Indications: Nutritional Support      ferrous sulfate (IRON 325) 325 (65 Fe) MG tablet Take 1 tablet by mouth daily (with breakfast) Indications: Nutritional Support      gabapentin (NEURONTIN) 100 MG capsule Take 1 capsule by mouth 2 times daily.  Indications: Pain      hydrocortisone 1 % cream Apply topically every 4 hours as needed      hyoscyamine (ANASPAZ;LEVSIN) 125 MCG tablet Take 1 tablet by mouth every 4 hours as needed for Cramping Indications: Excessive Secretions in the Upper Airway      escitalopram (LEXAPRO) 20 MG tablet Take 1 tablet by mouth daily Indications: Depression      bimatoprost (LUMIGAN) 0.01 % SOLN ophthalmic drops Place 1 drop into both eyes nightly Indications: Glaucoma      magnesium oxide (MAG-OX) 400 (240 Mg) MG tablet Take 1 tablet by mouth daily Indications: Nutritional Support      metoprolol tartrate (LOPRESSOR) 25 MG tablet Take 1 tablet by mouth daily Indications: High Blood Pressure Disorder      polyethylene glycol (GLYCOLAX) 17 GM/SCOOP powder Take 17 g by mouth daily as needed Indications: Constipation      promethazine (PHENERGAN) 25 MG tablet Take 1 tablet by mouth every 4 hours as needed for Nausea      senna (SENOKOT) 8.6 MG tablet Take 2

## 2023-09-08 NOTE — PROGRESS NOTES
SUBJECTIVE:        ROS:  The rest of the 14 point ROS negative    PHYSICAL EXAM: VSS per facility record      ASSESSMENT & PLAN:   Diagnosis Orders   1. Spinal stenosis, unspecified spinal region        2. Hypothyroidism, unspecified type        3. Primary hypertension                      No past medical history on file. No past surgical history on file. Current Outpatient Medications on File Prior to Visit   Medication Sig Dispense Refill    aspirin 81 MG EC tablet Take 1 tablet by mouth daily Indications: Temporary Stroke      atorvastatin (LIPITOR) 40 MG tablet Take 1 tablet by mouth daily Indications: High Amount of Fats in the Blood      vitamin B-12 (CYANOCOBALAMIN) 1000 MCG tablet Take 1 tablet by mouth daily Indications: Nutritional Support      ferrous sulfate (IRON 325) 325 (65 Fe) MG tablet Take 1 tablet by mouth daily (with breakfast) Indications: Nutritional Support      gabapentin (NEURONTIN) 100 MG capsule Take 1 capsule by mouth 2 times daily.  Indications: Pain      hydrocortisone 1 % cream Apply topically every 4 hours as needed      hyoscyamine (ANASPAZ;LEVSIN) 125 MCG tablet Take 1 tablet by mouth every 4 hours as needed for Cramping Indications: Excessive Secretions in the Upper Airway      escitalopram (LEXAPRO) 20 MG tablet Take 1 tablet by mouth daily Indications: Depression      bimatoprost (LUMIGAN) 0.01 % SOLN ophthalmic drops Place 1 drop into both eyes nightly Indications: Glaucoma      magnesium oxide (MAG-OX) 400 (240 Mg) MG tablet Take 1 tablet by mouth daily Indications: Nutritional Support      metoprolol tartrate (LOPRESSOR) 25 MG tablet Take 1 tablet by mouth daily Indications: High Blood Pressure Disorder      polyethylene glycol (GLYCOLAX) 17 GM/SCOOP powder Take 17 g by mouth daily as needed Indications: Constipation      promethazine (PHENERGAN) 25 MG tablet Take 1 tablet by mouth every 4 hours as needed for Nausea      senna (SENOKOT) 8.6 MG tablet Take 2 tablets by

## 2023-09-21 NOTE — PROGRESS NOTES
SUBJECTIVE:  68-year-old woman seen for follow-up visit for afibrillation spinal stenosis weakness hypertension patient function at her baseline globally weak with some intermittent pain      ROS: Denies emesis fevers chills  The rest of the 14 point ROS negative    PHYSICAL EXAM: VSS per facility record  Pupils are small but reactive oral mucosa moist chest showed no crackles no wheezing cardiovascular showed a regular rate abdomen soft nontender    ASSESSMENT & PLAN:   Diagnosis Orders   1. Persistent atrial fibrillation ([address])        2. Primary hypertension        3. Spinal stenosis, unspecified spinal region          No evidence of new RVR continue beta-blocker without aggressive anticoagulation. Systolic pressure stable ongoing weakness lower extremities has been on gabapentin in the past            No past medical history on file. No past surgical history on file. Current Outpatient Medications on File Prior to Visit   Medication Sig Dispense Refill    aspirin 81 MG EC tablet Take 1 tablet by mouth daily Indications: Temporary Stroke      atorvastatin (LIPITOR) 40 MG tablet Take 1 tablet by mouth daily Indications: High Amount of Fats in the Blood      vitamin B-12 (CYANOCOBALAMIN) 1000 MCG tablet Take 1 tablet by mouth daily Indications: Nutritional Support      ferrous sulfate (IRON 325) 325 (65 Fe) MG tablet Take 1 tablet by mouth daily (with breakfast) Indications: Nutritional Support      gabapentin (NEURONTIN) 100 MG capsule Take 1 capsule by mouth 2 times daily.  Indications: Pain      hydrocortisone 1 % cream Apply topically every 4 hours as needed      hyoscyamine (ANASPAZ;LEVSIN) 125 MCG tablet Take 1 tablet by mouth every 4 hours as needed for Cramping Indications: Excessive Secretions in the Upper Airway      escitalopram (LEXAPRO) 20 MG tablet Take 1 tablet by mouth daily Indications: Depression      bimatoprost (LUMIGAN) 0.01 % SOLN ophthalmic drops Place 1 drop into both eyes nightly